# Patient Record
(demographics unavailable — no encounter records)

---

## 2024-11-15 NOTE — DISCUSSION/SUMMARY
[de-identified] : Patient is a 68-year-old male with right knee osteoarthritis with recent fall.  He does not appear to have prepatellar bursitis at this time.  There are also no signs of fracture.  I believe his pain is stemming simply from the abrasion.  If he develops any significant erythema, warmth, or fever/chills he will call the office back.  Otherwise he will ice and elevate at home. I recommended a course of Mobic. The patient was given a prescription for the Mobic with directions. The patient was instructed to stop the medicine and call the office if there are any adverse reaction to the medicine. The patient was also instructed to consult with their primary care doctor prior to starting the medication.  Follow-up as

## 2024-11-15 NOTE — HISTORY OF PRESENT ILLNESS
[de-identified] : Patient is a 60-year-old male presenting for evaluation of 3-week history of right knee pain he fell "hard" on the anterior aspect of the right knee.  He noticed an abrasion just beneath the kneecap.  He has had some minor swelling and pain in the area however this has not stopped him from daily activities.  He denies any erythema, fever, or chills.  Patient presents to make sure his knee is okay.

## 2024-11-15 NOTE — PHYSICAL EXAM
[de-identified] : Multi body exam  The patient appears well nourished and in no apparent distress. The patient is alert and oriented to person, place, and time. Affect and mood appear normal. The head is normocephalic and atraumatic. The eyes reveal normal sclera and extra ocular muscles are intact. The tongue is midline with no apparent lesions. Skin shows normal turgor with no evidence of eczema or psoriasis. No respiratory distress noted. Sensation grossly intact.   [de-identified] : Exam right knee: Skin reveals 2 mm x 2 mm scab with minimal reactive erythema at the edges just about the inferior pole patella.  There is no warmth.  Minimally tender over the scab.  There is no obvious prepatellar bursitis.  Extensor mechanism is intact.  Strength 5/5.  Minimal pain at deep flexion, range of motion 0 to 130 degrees of flexion. [de-identified] : X-ray 4 views right knee demonstrate no signs of fracture, minimal osteoarthritis.